# Patient Record
(demographics unavailable — no encounter records)

---

## 2024-11-18 NOTE — CONSULT LETTER
[Dear  ___] : Dear  [unfilled], [Courtesy Letter:] : I had the pleasure of seeing your patient, [unfilled], in my office today. [Please see my note below.] : Please see my note below. [Consult Closing:] : Thank you very much for allowing me to participate in the care of this patient.  If you have any questions, please do not hesitate to contact me. [Sincerely,] : Sincerely, [DrYunier  ___] : Dr. WHITE [FreeTextEntry3] : Chanel Lloyd MD FACS

## 2024-11-18 NOTE — PAST MEDICAL HISTORY
[Menarche Age ____] : age at menarche was [unfilled] [Definite ___ (Date)] : the last menstrual period was [unfilled] [Total Preg ___] : G[unfilled] [Live Births ___] : P[unfilled]  [Age At Live Birth ___] : Age at live birth: [unfilled] [FreeTextEntry3] : Had not had a cycle for 1 year ending 8/19 then had 1 menstrual period in 10/19.

## 2024-11-18 NOTE — PHYSICAL EXAM
[Normocephalic] : normocephalic [Atraumatic] : atraumatic [Sclera nonicteric] : sclera nonicteric [Supple] : supple [No Supraclavicular Adenopathy] : no supraclavicular adenopathy [No Cervical Adenopathy] : no cervical adenopathy [Clear to Auscultation Bilat] : clear to auscultation bilaterally [Normal Sinus Rhythm] : normal sinus rhythm [Examined in the supine and seated position] : examined in the supine and seated position [Symmetrical] : symmetrical [Grade 2] : Ptosis Grade 2 [No dominant masses] : no dominant masses in right breast  [No dominant masses] : no dominant masses left breast [No Nipple Retraction] : no left nipple retraction [No Nipple Discharge] : no left nipple discharge [No Axillary Lymphadenopathy] : no left axillary lymphadenopathy [No Edema] : no edema [No Rashes] : no rashes [No Ulceration] : no ulceration [de-identified] : UOQ radial scar [de-identified] : Lateral radial scar

## 2024-11-18 NOTE — ASSESSMENT
[FreeTextEntry1] : Ms. Vogt is a 60 year old woman at high risk for breast cancer. Her breast exam today is without suspicious findings. A breast MRI is ordered for January. I would like to see her back for a follow-up in 1 year. She understands and is comfortable with the plan. She is encouraged to call if any questions or concerns arise.

## 2024-11-18 NOTE — HISTORY OF PRESENT ILLNESS
[FreeTextEntry1] : Ms. Vogt is a 60 year old woman here for a follow-up for a high risk for breast cancer. Her breast history is notable for a benign right breast excision, and a benign left breast excision for ADH for which she does not take tamoxifen due to a lupus inhibitor and anti-phospholipid syndrome. She has no breast symptoms. No palpable breast or axillary lumps, nipple discharge, or breast skin changes. .  Her genetic panel testing is negative. Her family history is significant for breast cancer in her mother at 30, sister at 57, and three maternal cousins at 30, 45, and 50; prior BRCA testing in family members was negative.